# Patient Record
Sex: MALE | Race: OTHER | HISPANIC OR LATINO | Employment: UNEMPLOYED | URBAN - METROPOLITAN AREA
[De-identification: names, ages, dates, MRNs, and addresses within clinical notes are randomized per-mention and may not be internally consistent; named-entity substitution may affect disease eponyms.]

---

## 2018-11-28 ENCOUNTER — OFFICE VISIT CONVERTED (OUTPATIENT)
Dept: PULMONOLOGY | Facility: CLINIC | Age: 43
End: 2018-11-28
Attending: PHYSICIAN ASSISTANT

## 2021-05-28 VITALS
BODY MASS INDEX: 24.57 KG/M2 | HEIGHT: 68 IN | DIASTOLIC BLOOD PRESSURE: 79 MMHG | HEART RATE: 70 BPM | WEIGHT: 162.12 LBS | TEMPERATURE: 98 F | OXYGEN SATURATION: 97 % | RESPIRATION RATE: 14 BRPM | SYSTOLIC BLOOD PRESSURE: 131 MMHG

## 2021-05-28 NOTE — PROGRESS NOTES
Patient: QIAN CHOUDHURY     Acct: [id number]     Report: #[id number]  UNIT #: [id number]     : 1975    Encounter Date:2018  PRIMARY CARE: CAROLANN CARRILLO  ***Signed***  --------------------------------------------------------------------------------------------------------------------  Chief Complaint      Encounter Date      2018            Primary Care Provider      CAROLANN CARRILLO            Referring Provider      SELF,REFERRED            Patient Complaint      Patient is complaining of      1 year f/u Pulmonary nodules            VITALS      Height 5 ft 8 in / 172.72 cm      Weight 162 lbs 2 oz / 73.479709 kg      BSA 1.87 m2      BMI 24.7 kg/m2      Temperature 98.0 F / 36.67 C - Oral      Pulse 70      Respirations 14      Blood Pressure 131/79 Sitting, Right Arm      Pulse Oximetry 97%, room air            HPI      The patient is a very pleasant 43 year old  male who is a patient of Dr. Bronson''s here for annual follow up visit today.             The patient reports he has done quite well within the past year . He works     outside doing construction and denies any issues with dyspnea, coughing or     wheezing. He is able to complete all his daily activities including heavy     exertion without any symptoms. He denies fevers or chills or hemoptysis. He     denies chest pain. He is still smoking a few cigarettes a day anywhere from 3-7     cigarettes a day but he wishes to quit smoking. He did quit drinking alcohol 26     months ago.             I have reviewed his Review of Systems medical, surgical and family history and     agree with those as entered.      Copies To:   Bert Bronson ;            ROBERT      Constitutional:  Denies: Fatigue, Fever, Weight gain, Weight loss, Chills,     Insomnia, Other      Respiratory/Breathing:  Complains of: Shortness of air, Wheezing, Cough; Denies:    Hemoptysis, Pleuritic pain, Other      Endocrine:  Denies: Polydipsia,  "Polyuria, Heat/cold intolerance, Diabetes, Other      Eyes:  Denies: Blurred vision, Vision Changes, Other      Ears, nose, mouth, throat:  Denies: Mouth lesions, Thrush, Throat pain,     Hoarseness, Allergies/Hay Fever, Post Nasal Drip, Headaches, Recent Head Injury,    Nose Bleeding, Neck Stiffness, Thyroid Mass, Hearing Loss, Ear Fullness, Dry     Mouth, Nasal or Sinus Pain, Dry Lips, Nasal discharge, Nasal congestion, Other      Cardiovascular:  Denies: Palpitations, Syncope, Claudication, Chest Pain, Wake     up Gasping for air, Leg Swelling, Irregular Heart Rate, Cyanosis, Dyspnea on     Exertion, Other      Gastrointestinal:  Denies: Nausea, Constipation, Diarrhea, Abdominal pain,     Vomiting, Difficulty Swallowing, Reflux/Heartburn, Dysphagia, Jaundice,     Bloating, Melena, Bloody stools, Other      Genitourinary:  Denies: Urinary frequency, Incontinence, Hematuria, Urgency,     Nocturia, Dysuria, Testicular problems, Other      Musculoskeletal:  Denies: Joint Pain, Joint Stiffness, Joint Swelling, Myalgias,    Other      Hematologic/lymphatic:  DENIES: Lymphadenopathy, Bruising, Bleeding tendencies,     Other      Neurological:  Denies: Headache, Numbness, Weakness, Seizures, Other      Psychiatric:  Denies: Anxiety, Appropriate Effect, Depression, Other      Sleep:  No: Excessive daytime sleep, Morning Headache?, Snoring, Insomnia?, Stop    breathing at sleep?, Other      Integumentary:  Denies: Rash, Dry skin, Skin Warm to Touch, Other      Immunologic/Allergic:  Denies: Latex allergy, Seasonal allergies, Asthma,     Urticaria, Eczema, Other      Immunization status:  No: Up to date            FAMILY/SOCIAL/MEDICAL HX      Surgical History:  Yes: Abdominal Surgery (repair from trauma), Appendectomy      Diabetes - Family Hx:  Mother      Is Father Still Living?:  No      Is Mother Still Living?:  Yes      Social History:  Tobacco Use, Alcohol Use (\"FORMER HEAVY DRINKER\" ); No     Recreational Drug use   "    Smoking status:  Current every day smoker (.5 ppd x 15 y on and off, 5 or less     daily x 1 year)      Anticoagulation Therapy:  No      Antibiotic Prophylaxis:  No      Medical History:  No: Blood Disease, Chemotherapy/Cancer, Deafness or Ringing     Ears, Hemorrhoids/Rectal Prob, Shortness Of Breath, Sinus Trouble, Miscellaneous    Medical/oth            PREVENTION      Hx Influenza Vaccination:  No      Influenza Vaccine Declined:  Yes      2 or More Falls Past Year?:  No      Fall Past Year with Injury?:  No      Hx Pneumococcal Vaccination:  No      Encouraged to follow-up with:  PCP regarding preventative exams.      Chart initiated by      Nicole Sierra ma.            ALLERGIES/MEDICATIONS      Allergies:        Coded Allergies:             NO KNOWN DRUG ALLERGIES (Verified  Allergy, Unknown, 11/28/18)      Medications    Last Reconciled on 11/28/18 09:56 by TRELL POMPA      Nicotine Polacrilex (Nicorette) 2 Mg Gum      2 MG PO Q4-6H, #180 BOX 2 Refills         Prov: Nora Stallworth PA-C         11/28/18      Current Medications      Current Medications Reviewed 11/28/18            EXAM      GEN-patient appears stated age resting comfortable in no acute distress      Eyes-PERRL,  conjunctiva are normal in appearance extraocular muscles are     intact, no scleral icterus      Nasal-both nares are patent turbinates appear normal no polyps seen no nasal     discharge or ulcerations      Ears-tympanic membranes are normal no erythema no bulging, normal to inspection      Lymphatic-no swollen or enlarged cervical nodes, or axillary node, or femoral     nodes, or supraclavicular nodes      Mouth normal dentition, no erythema no ulcerations oropharynx appears normal no     exudate no evidence of postnasal drip, MP(default value)      Neck-there are no palpable supraclavicular or cervical adenopathy, thyroid is     normal in appearance no apparent nodules, there is no inspiratory or expiratory     stridor       Respiratory-Grossly clear to auscultation bilaterally without wheezes, rhonchi     or crackles appreciated.  Normal work of breathing noted.       Cardiovascular-the heart rate is normal and regular S1 and S2 present with no     murmur or extra heart sounds, there is no JVD or pedal edema present      GI-the abdomen is normal in appearance, bowel sounds present and normal in all     quadrants no hepatosplenomegaly or masses felt      Extremities-no clubbing is present, pulses present in all extremities, capillary    refill time is normal      Musculoskeletal-Normal strength in upper and lower extremities, inspection shows    no evidence of muscle atrophy      Skin-skin is normal in appearance it is warm and dry, no rashes present, no     evidence of cyanosis, palpation reveals no masses      Neurological-the patient is alert and oriented to time place and person, moves     all 4 extremities, normal gait, normal affect and mood, CN2-12 intact      Psych-normal judgment and insight is good, normal mood and affect, alert and     oriented to person, place, and time, and date      Vtials      Vitals:             Height 5 ft 8 in / 172.72 cm           Weight 162 lbs 2 oz / 73.071059 kg           BSA 1.87 m2           BMI 24.7 kg/m2           Temperature 98.0 F / 36.67 C - Oral           Pulse 70           Respirations 14           Blood Pressure 131/79 Sitting, Right Arm           Pulse Oximetry 97%, room air            REVIEW      Results Reviewed      PCCS Results Reviewed?:  Yes Prev Lab Results, Yes Prev Radiology Results, Yes     Previous Mecial Records            Assessment      Notes      New Medications      * Nicotine Polacrilex (Nicorette) 2 MG GUM: 2 MG PO Q4-6H #180      ASSESSMENT:      1. Multiple pulmonary nodules likely granulomatous.      2. Tobacco abuse with ongoing cigarettes smoking wishes to quit.             PLAN:       1. I have discussed with the patient that his previous chest CT scan from  2016     there were multiple pulmonary nodules which were chronic and consistent with     granuloma and he does not need further follow up at this time.       2. I spent 5 minutes today discussing smoking cessation and he is interested in     quitting smoking. He would like to try nicotine gum so I have prescribed that     today.       3. The patient declines a flu vaccine today.       4. Follow up in 1 year with Dr. Bronson, sooner if needed.            Patient Education      Tobacco Cessation Counseling:  for 3 - 10 minutes      Patient Education Provided:  Smoking Cessation      Time Spent:  > 50% /Coord Care                 Disclaimer: Converted document may not contain table formatting or lab diagrams. Please see [employer] [facility] for the authenticated document.

## 2022-07-02 ENCOUNTER — HOSPITAL ENCOUNTER (EMERGENCY)
Facility: HOSPITAL | Age: 47
Discharge: HOME | End: 2022-07-02
Attending: EMERGENCY MEDICINE | Admitting: EMERGENCY MEDICINE

## 2022-07-02 ENCOUNTER — APPOINTMENT (OUTPATIENT)
Dept: GENERAL RADIOLOGY | Facility: HOSPITAL | Age: 47
End: 2022-07-02

## 2022-07-02 VITALS
WEIGHT: 147.93 LBS | BODY MASS INDEX: 23.22 KG/M2 | DIASTOLIC BLOOD PRESSURE: 82 MMHG | HEART RATE: 67 BPM | TEMPERATURE: 97.3 F | RESPIRATION RATE: 16 BRPM | SYSTOLIC BLOOD PRESSURE: 131 MMHG | OXYGEN SATURATION: 100 % | HEIGHT: 67 IN

## 2022-07-02 DIAGNOSIS — M54.50 ACUTE BILATERAL LOW BACK PAIN, UNSPECIFIED WHETHER SCIATICA PRESENT: Primary | ICD-10-CM

## 2022-07-02 PROCEDURE — 72110 X-RAY EXAM L-2 SPINE 4/>VWS: CPT

## 2022-07-02 PROCEDURE — 25010000002 DEXAMETHASONE PER 1 MG: Performed by: NURSE PRACTITIONER

## 2022-07-02 PROCEDURE — 99283 EMERGENCY DEPT VISIT LOW MDM: CPT

## 2022-07-02 PROCEDURE — 96372 THER/PROPH/DIAG INJ SC/IM: CPT

## 2022-07-02 PROCEDURE — 25010000002 KETOROLAC TROMETHAMINE PER 15 MG: Performed by: NURSE PRACTITIONER

## 2022-07-02 RX ORDER — CYCLOBENZAPRINE HCL 10 MG
10 TABLET ORAL 3 TIMES DAILY PRN
Qty: 12 TABLET | Refills: 0 | Status: SHIPPED | OUTPATIENT
Start: 2022-07-02

## 2022-07-02 RX ORDER — METHYLPREDNISOLONE 4 MG/1
TABLET ORAL
Qty: 21 TABLET | Refills: 0 | Status: SHIPPED | OUTPATIENT
Start: 2022-07-02

## 2022-07-02 RX ORDER — DEXAMETHASONE SODIUM PHOSPHATE 10 MG/ML
10 INJECTION INTRAMUSCULAR; INTRAVENOUS ONCE
Status: COMPLETED | OUTPATIENT
Start: 2022-07-02 | End: 2022-07-02

## 2022-07-02 RX ORDER — KETOROLAC TROMETHAMINE 30 MG/ML
30 INJECTION, SOLUTION INTRAMUSCULAR; INTRAVENOUS ONCE
Status: COMPLETED | OUTPATIENT
Start: 2022-07-02 | End: 2022-07-02

## 2022-07-02 RX ORDER — IBUPROFEN 800 MG/1
800 TABLET ORAL 3 TIMES DAILY PRN
Qty: 20 TABLET | Refills: 0 | Status: SHIPPED | OUTPATIENT
Start: 2022-07-02

## 2022-07-02 RX ADMIN — KETOROLAC TROMETHAMINE 30 MG: 30 INJECTION, SOLUTION INTRAMUSCULAR; INTRAVENOUS at 14:42

## 2022-07-02 RX ADMIN — DEXAMETHASONE SODIUM PHOSPHATE 10 MG: 10 INJECTION INTRAMUSCULAR; INTRAVENOUS at 14:42

## 2022-07-02 NOTE — ED PROVIDER NOTES
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 He has continued to work since that time and it has been waxing and waning in intensity.       History provided by:  Patient  Back Pain  Location:  Lumbar spine  Quality:  Burning  Radiates to:  Does not radiate  Pain severity:  Moderate  Pain is:  Same all the time  Onset quality:  Sudden  Duration:  2 months  Timing:  Intermittent  Progression:  Waxing and waning  Chronicity:  New  Context: lifting heavy objects    Relieved by:  Nothing  Worsened by:  Movement  Ineffective treatments:  None tried  Associated symptoms: leg pain    Associated symptoms: no abdominal pain, no abdominal swelling, no bladder incontinence, no bowel incontinence, no chest pain, no fever, no headaches, no numbness, no paresthesias, no pelvic pain, no perianal numbness, no tingling, no weakness and no weight loss        Review of Systems   Constitutional: Negative for chills, fever and weight loss.   HENT: Negative for congestion, ear pain and sore throat.    Eyes: Negative for pain.   Respiratory: Negative for cough, chest tightness and shortness of breath.    Cardiovascular: Negative for chest pain.   Gastrointestinal: Negative for abdominal pain, bowel incontinence, diarrhea, nausea and vomiting.   Genitourinary: Negative for bladder incontinence, flank pain, hematuria and pelvic pain.   Musculoskeletal: Positive for back pain. Negative for joint swelling.   Skin: Negative for pallor.   Neurological: Negative for tingling, seizures, weakness, numbness, headaches and paresthesias.   All other systems reviewed and are negative.      Past Medical History:   Diagnosis Date   • Injury of back        No Known Allergies    Past Surgical History:   Procedure Laterality Date   • ABDOMINAL SURGERY     • APPENDECTOMY         History reviewed. No pertinent family history.    Social History     Socioeconomic History   •  Marital status: Single   Tobacco Use   • Smoking status: Current Every Day Smoker     Types: Cigarettes   • Smokeless tobacco: Never Used   Substance and Sexual Activity   • Alcohol use: Never   • Drug use: Never           Objective   Physical Exam  Vitals and nursing note reviewed.   Constitutional:       General: He is not in acute distress.     Appearance: Normal appearance. He is not toxic-appearing.   HENT:      Head: Normocephalic and atraumatic.      Mouth/Throat:      Mouth: Mucous membranes are moist.   Eyes:      General: No scleral icterus.  Cardiovascular:      Rate and Rhythm: Normal rate and regular rhythm.      Pulses: Normal pulses.      Heart sounds: Normal heart sounds.   Pulmonary:      Effort: Pulmonary effort is normal. No respiratory distress.      Breath sounds: Normal breath sounds.   Abdominal:      General: Abdomen is flat.      Palpations: Abdomen is soft.      Tenderness: There is no abdominal tenderness.   Musculoskeletal:         General: Normal range of motion.      Cervical back: Normal range of motion and neck supple.      Lumbar back: No swelling, edema, deformity, signs of trauma, lacerations, spasms, tenderness or bony tenderness. Normal range of motion. No scoliosis.        Back:    Skin:     General: Skin is warm and dry.   Neurological:      Mental Status: He is alert and oriented to person, place, and time. Mental status is at baseline.         Procedures           ED Course                                           MDM  Number of Diagnoses or Management Options  Acute bilateral low back pain, unspecified whether sciatica present: new and requires workup  Diagnosis management comments: The patient´s symptoms are consistent with musculoskeletal back pain. The patient is now resting comfortably, feels better, is alert, talkative, interactive and in no distress. The repeat examination is unremarkable and benign. The patient is neurologically intact and is ambulatory in the ED.  The patient has no fever, no bowel or bladder incontinence, no saddle anesthesia, and is otherwise alert and well appearing. The history, physical exam, and diagnostics (if any) do not suggest the presence of acute spinal epidural abscess, acute spinal epidural bleed, cauda equina syndrome, abdominal aortic aneurysm, aortic dissection or other process requiring further testing, treatment or consultation in the emergency department. The vital signs have been stable. The patient's condition is stable and appropriate for discharge. The patient will pursue further outpatient evaluation with the primary care physician or other designated for consulting position as indicated in the discharge instructions.       Amount and/or Complexity of Data Reviewed  Tests in the radiology section of CPT®: reviewed    Risk of Complications, Morbidity, and/or Mortality  Presenting problems: low  Diagnostic procedures: low  Management options: low    Patient Progress  Patient progress: stable      Final diagnoses:   Acute bilateral low back pain, unspecified whether sciatica present       ED Disposition  ED Disposition     ED Disposition   Discharge    Condition   Stable    Comment   --             Provider, No Known  [facility]  [place] [address]    In 3 days  As needed         Medication List      New Prescriptions    cyclobenzaprine 10 MG tablet  Commonly known as: FLEXERIL  Take 1 tablet by mouth 3 (Three) Times a Day As Needed for Muscle Spasms.     ibuprofen 800 MG tablet  Commonly known as: ADVIL,MOTRIN  Take 1 tablet by mouth 3 (Three) Times a Day As Needed for Mild Pain .     methylPREDNISolone 4 MG dose pack  Commonly known as: MEDROL  Take as directed on package instructions.           Where to Get Your Medications      These medications were sent to [id number] - [place] - [address] AT [facility] [phone] [facility] [phone] [address]  [address],  [place] [address]    Hours: 24-hours Phone: [phone]   · cyclobenzaprine 10 MG tablet  · ibuprofen 800 MG tablet  · methylPREDNISolone 4 MG dose pack          Julito Baca, CHASE  07/02/22 [id number]

## 2023-06-14 ENCOUNTER — HOSPITAL ENCOUNTER (EMERGENCY)
Facility: HOSPITAL | Age: 48
Discharge: HOME | End: 2023-06-15

## 2023-06-14 VITALS
WEIGHT: 144.4 LBS | BODY MASS INDEX: 22.66 KG/M2 | SYSTOLIC BLOOD PRESSURE: 135 MMHG | OXYGEN SATURATION: 98 % | HEIGHT: 67 IN | HEART RATE: 67 BPM | RESPIRATION RATE: 16 BRPM | TEMPERATURE: 98.2 F | DIASTOLIC BLOOD PRESSURE: 91 MMHG

## 2023-06-14 DIAGNOSIS — Z53.21 ELOPED FROM EMERGENCY DEPARTMENT: Primary | ICD-10-CM

## 2023-06-14 LAB
ALBUMIN SERPL-MCNC: 4.5 G/DL (ref 3.5–5.2)
ALBUMIN/GLOB SERPL: 1.7 G/DL
ALP SERPL-CCNC: 81 U/L (ref 39–117)
ALT SERPL W P-5'-P-CCNC: 10 U/L (ref 1–41)
ANION GAP SERPL CALCULATED.3IONS-SCNC: 10.8 MMOL/L (ref 5–15)
AST SERPL-CCNC: 18 U/L (ref 1–40)
BACTERIA UR QL AUTO: ABNORMAL /HPF
BASOPHILS # BLD AUTO: 0.05 10*3/MM3 (ref 0–0.2)
BASOPHILS NFR BLD AUTO: 0.4 % (ref 0–1.5)
BILIRUB SERPL-MCNC: 0.4 MG/DL (ref 0–1.2)
BILIRUB UR QL STRIP: NEGATIVE
BUN SERPL-MCNC: 11 MG/DL (ref 6–20)
BUN/CREAT SERPL: 12.2 (ref 7–25)
CALCIUM SPEC-SCNC: 9.4 MG/DL (ref 8.6–10.5)
CHLORIDE SERPL-SCNC: 101 MMOL/L (ref 98–107)
CLARITY UR: CLEAR
CO2 SERPL-SCNC: 26.2 MMOL/L (ref 22–29)
COLOR UR: YELLOW
CREAT SERPL-MCNC: 0.9 MG/DL (ref 0.76–1.27)
D-LACTATE SERPL-SCNC: 0.7 MMOL/L (ref 0.5–2)
DEPRECATED RDW RBC AUTO: 38.3 FL (ref 37–54)
EGFRCR SERPLBLD CKD-EPI 2021: 104 ML/MIN/1.73
EOSINOPHIL # BLD AUTO: 0.22 10*3/MM3 (ref 0–0.4)
EOSINOPHIL NFR BLD AUTO: 1.8 % (ref 0.3–6.2)
ERYTHROCYTE [DISTWIDTH] IN BLOOD BY AUTOMATED COUNT: 11.6 % (ref 12.3–15.4)
GLOBULIN UR ELPH-MCNC: 2.7 GM/DL
GLUCOSE SERPL-MCNC: 98 MG/DL (ref 65–99)
GLUCOSE UR STRIP-MCNC: NEGATIVE MG/DL
HCT VFR BLD AUTO: 47.8 % (ref 37.5–51)
HGB BLD-MCNC: 16.5 G/DL (ref 13–17.7)
HGB UR QL STRIP.AUTO: ABNORMAL
HOLD SPECIMEN: NORMAL
HOLD SPECIMEN: NORMAL
HYALINE CASTS UR QL AUTO: ABNORMAL /LPF
IMM GRANULOCYTES # BLD AUTO: 0.03 10*3/MM3 (ref 0–0.05)
IMM GRANULOCYTES NFR BLD AUTO: 0.2 % (ref 0–0.5)
KETONES UR QL STRIP: NEGATIVE
LEUKOCYTE ESTERASE UR QL STRIP.AUTO: NEGATIVE
LIPASE SERPL-CCNC: 26 U/L (ref 13–60)
LYMPHOCYTES # BLD AUTO: 2.86 10*3/MM3 (ref 0.7–3.1)
LYMPHOCYTES NFR BLD AUTO: 23 % (ref 19.6–45.3)
MCH RBC QN AUTO: 31.1 PG (ref 26.6–33)
MCHC RBC AUTO-ENTMCNC: 34.5 G/DL (ref 31.5–35.7)
MCV RBC AUTO: 90 FL (ref 79–97)
MONOCYTES # BLD AUTO: 0.96 10*3/MM3 (ref 0.1–0.9)
MONOCYTES NFR BLD AUTO: 7.7 % (ref 5–12)
NEUTROPHILS NFR BLD AUTO: 66.9 % (ref 42.7–76)
NEUTROPHILS NFR BLD AUTO: 8.29 10*3/MM3 (ref 1.7–7)
NITRITE UR QL STRIP: NEGATIVE
NRBC BLD AUTO-RTO: 0 /100 WBC (ref 0–0.2)
PH UR STRIP.AUTO: 6.5 [PH] (ref 5–8)
PLATELET # BLD AUTO: 362 10*3/MM3 (ref 140–450)
PMV BLD AUTO: 8.4 FL (ref 6–12)
POTASSIUM SERPL-SCNC: 3.6 MMOL/L (ref 3.5–5.2)
PROT SERPL-MCNC: 7.2 G/DL (ref 6–8.5)
PROT UR QL STRIP: NEGATIVE
RBC # BLD AUTO: 5.31 10*6/MM3 (ref 4.14–5.8)
RBC # UR STRIP: ABNORMAL /HPF
REF LAB TEST METHOD: ABNORMAL
SODIUM SERPL-SCNC: 138 MMOL/L (ref 136–145)
SP GR UR STRIP: 1.02 (ref 1–1.03)
SQUAMOUS #/AREA URNS HPF: ABNORMAL /HPF
UROBILINOGEN UR QL STRIP: ABNORMAL
WBC # UR STRIP: ABNORMAL /HPF
WBC NRBC COR # BLD: 12.41 10*3/MM3 (ref 3.4–10.8)
WHOLE BLOOD HOLD COAG: NORMAL
WHOLE BLOOD HOLD SPECIMEN: NORMAL

## 2023-06-14 PROCEDURE — 81001 URINALYSIS AUTO W/SCOPE: CPT

## 2023-06-14 PROCEDURE — 83605 ASSAY OF LACTIC ACID: CPT

## 2023-06-14 PROCEDURE — 85025 COMPLETE CBC W/AUTO DIFF WBC: CPT

## 2023-06-14 PROCEDURE — 83690 ASSAY OF LIPASE: CPT

## 2023-06-14 PROCEDURE — 36415 COLL VENOUS BLD VENIPUNCTURE: CPT

## 2023-06-14 PROCEDURE — 80053 COMPREHEN METABOLIC PANEL: CPT

## 2023-06-14 RX ORDER — SODIUM CHLORIDE 0.9 % (FLUSH) 0.9 %
10 SYRINGE (ML) INJECTION AS NEEDED
Status: DISCONTINUED | OUTPATIENT
Start: 2023-06-14 | End: 2023-06-15 | Stop reason: HOSPADM

## 2023-06-15 NOTE — ED PROVIDER NOTES
"Time: 9:49 PM EDT  Date of encounter:  6/14/2023  Independent Historian/Clinical History and Information was obtained by:   Patient  Chief Complaint   Patient presents with    Abdominal Pain       History is limited by: N/A    History of Present Illness:  Patient is a 48 y.o. year old male who presents to the emergency department for evaluation of abdominal pain.  States pain is generalized.  Started today.  Has had it previously.  Denies any nausea, vomiting, diarrhea, dysuria. (Lanre Correa PA-C provider in triage 9:49 PM EDT )     [facility]    Patient Care Team  Primary Care Provider: Provider, No Known    Past Medical History:     No Known Allergies  Past Medical History:   Diagnosis Date    Injury of back      Past Surgical History:   Procedure Laterality Date    ABDOMINAL SURGERY      APPENDECTOMY       No family history on file.    Home Medications:  Prior to Admission medications    Medication Sig Start Date End Date Taking? Authorizing Provider   cyclobenzaprine (FLEXERIL) 10 MG tablet Take 1 tablet by mouth 3 (Three) Times a Day As Needed for Muscle Spasms. 7/2/22   Julito Baca APRN   ibuprofen (ADVIL,MOTRIN) 800 MG tablet Take 1 tablet by mouth 3 (Three) Times a Day As Needed for Mild Pain . 7/2/22   Julito Baca APRN   methylPREDNISolone (MEDROL) 4 MG dose pack Take as directed on package instructions. 7/2/22   Julito Baca APRN        Social History:   Social History     Tobacco Use    Smoking status: Every Day     Types: Cigarettes    Smokeless tobacco: Never   Substance Use Topics    Alcohol use: Never    Drug use: Never         Review of Systems:  Review of Systems   Gastrointestinal:  Positive for abdominal pain. Negative for diarrhea, nausea and vomiting.   Genitourinary:  Negative for dysuria and hematuria.      Physical Exam:  /91 (BP Location: Right arm, Patient Position: Sitting)   Pulse 67   Temp 98.2 °F (36.8 °C) (Oral)   Resp 16   Ht 170.2 cm (67\")   Wt " 65.5 kg (144 lb 6.4 oz)   SpO2 98%   BMI 22.62 kg/m²     Physical Exam  Constitutional:       Appearance: Normal appearance.   HENT:      Head: Normocephalic.   Eyes:      Extraocular Movements: Extraocular movements intact.      Conjunctiva/sclera: Conjunctivae normal.   Pulmonary:      Effort: Pulmonary effort is normal.   Abdominal:      General: A surgical scar is present. There is no distension.      Tenderness: There is generalized abdominal tenderness.   Skin:     General: Skin is warm.      Coloration: Skin is not cyanotic.   Neurological:      Mental Status: He is alert and oriented to person, place, and time.   Psychiatric:         Attention and Perception: Attention and perception normal.         Mood and Affect: Mood normal.                Procedures:  Procedures      Medical Decision Making:      Comorbidities that affect care:        External Notes reviewed:          The following orders were placed and all results were independently analyzed by me:  Orders Placed This Encounter   Procedures    [place] Draw    Comprehensive Metabolic Panel    Lipase    Urinalysis With Microscopic If Indicated (No Culture) - Urine, Clean Catch    Lactic Acid, Plasma    CBC Auto Differential    Urinalysis, Microscopic Only - Urine, Clean Catch    CBC & Differential    Green Top (Gel)    Lavender Top    Gold Top - SST    Light Blue Top       Medications Given in the Emergency Department:  Medications - No data to display       ED Course:    The patient was initially evaluated in the triage area where orders were placed. The patient was later dispositioned by Lanre Correa PA-C.      The patient was advised to stay for completion of workup which includes but is not limited to communication of labs and radiological results, reassessment and plan. The patient was advised that leaving prior to disposition by a provider could result in critical findings that are not communicated to the patient.          Labs:    Lab  Results (last 24 hours)       ** No results found for the last 24 hours. **             Imaging:    No Radiology Exams Resulted Within Past 24 Hours      Differential Diagnosis and Discussion:      Abdominal Pain: Based on the patient's signs and symptoms, I considered abdominal aortic aneurysm, small bowel obstruction, pancreatitis, acute cholecystitis, acute appendecitis, peptic ulcer disease, gastritis, colitis, endocrine disorders, irritable bowel syndrome and other differential diagnosis an etiology of the patient's abdominal pain.        MDM           Patient Care Considerations:          Consultants/Shared Management Plan:        Social Determinants of Health:          Disposition and Care Coordination:    Eloped: This patient has left the emergency department or waiting room with no communication to myself, nursing or administrative staff. There was no opportunity to discuss the patient's decision to leave, provide medical advice or discuss alternatives to. The staff has made efforts to locate patient without success.        Final diagnoses:   Eloped from emergency department        ED Disposition       ED Disposition   Eloped    Condition   --    Comment   Pt left and stated he felt better               This medical record created using voice recognition software.             Lanre Correa PANishiC  06/19/23 [id number]